# Patient Record
Sex: MALE | Race: WHITE
[De-identification: names, ages, dates, MRNs, and addresses within clinical notes are randomized per-mention and may not be internally consistent; named-entity substitution may affect disease eponyms.]

---

## 2018-05-20 ENCOUNTER — HOSPITAL ENCOUNTER (EMERGENCY)
Dept: HOSPITAL 47 - EC | Age: 23
Discharge: HOME | End: 2018-05-20
Payer: COMMERCIAL

## 2018-05-20 VITALS — SYSTOLIC BLOOD PRESSURE: 137 MMHG | TEMPERATURE: 98 F | DIASTOLIC BLOOD PRESSURE: 78 MMHG | HEART RATE: 80 BPM

## 2018-05-20 VITALS — RESPIRATION RATE: 18 BRPM

## 2018-05-20 DIAGNOSIS — K59.00: Primary | ICD-10-CM

## 2018-05-20 DIAGNOSIS — K30: ICD-10-CM

## 2018-05-20 DIAGNOSIS — Z87.891: ICD-10-CM

## 2018-05-20 LAB
ALBUMIN SERPL-MCNC: 4.4 G/DL (ref 3.5–5)
ALP SERPL-CCNC: 117 U/L (ref 38–126)
ALT SERPL-CCNC: 43 U/L (ref 21–72)
AMYLASE SERPL-CCNC: 47 U/L (ref 30–110)
ANION GAP SERPL CALC-SCNC: 15 MMOL/L
AST SERPL-CCNC: 31 U/L (ref 17–59)
BASOPHILS # BLD AUTO: 0 K/UL (ref 0–0.2)
BASOPHILS NFR BLD AUTO: 0 %
BUN SERPL-SCNC: 16 MG/DL (ref 9–20)
CALCIUM SPEC-MCNC: 9.8 MG/DL (ref 8.4–10.2)
CHLORIDE SERPL-SCNC: 103 MMOL/L (ref 98–107)
CO2 SERPL-SCNC: 26 MMOL/L (ref 22–30)
EOSINOPHIL # BLD AUTO: 0.2 K/UL (ref 0–0.7)
EOSINOPHIL NFR BLD AUTO: 3 %
ERYTHROCYTE [DISTWIDTH] IN BLOOD BY AUTOMATED COUNT: 4.92 M/UL (ref 4.3–5.9)
ERYTHROCYTE [DISTWIDTH] IN BLOOD: 12.4 % (ref 11.5–15.5)
GLUCOSE SERPL-MCNC: 116 MG/DL (ref 74–99)
HCT VFR BLD AUTO: 42.9 % (ref 39–53)
HGB BLD-MCNC: 14.7 GM/DL (ref 13–17.5)
LIPASE SERPL-CCNC: 124 U/L (ref 23–300)
LYMPHOCYTES # SPEC AUTO: 2.3 K/UL (ref 1–4.8)
LYMPHOCYTES NFR SPEC AUTO: 33 %
MCH RBC QN AUTO: 29.9 PG (ref 25–35)
MCHC RBC AUTO-ENTMCNC: 34.4 G/DL (ref 31–37)
MCV RBC AUTO: 87.1 FL (ref 80–100)
MONOCYTES # BLD AUTO: 0.4 K/UL (ref 0–1)
MONOCYTES NFR BLD AUTO: 6 %
NEUTROPHILS # BLD AUTO: 3.8 K/UL (ref 1.3–7.7)
NEUTROPHILS NFR BLD AUTO: 56 %
PH UR: 7 [PH] (ref 5–8)
PLATELET # BLD AUTO: 204 K/UL (ref 150–450)
POTASSIUM SERPL-SCNC: 3.9 MMOL/L (ref 3.5–5.1)
PROT SERPL-MCNC: 7 G/DL (ref 6.3–8.2)
PROT UR QL: (no result)
RBC UR QL: 2 /HPF (ref 0–5)
SODIUM SERPL-SCNC: 144 MMOL/L (ref 137–145)
SP GR UR: 1.03 (ref 1–1.03)
UROBILINOGEN UR QL STRIP: 2 MG/DL (ref ?–2)
WBC # BLD AUTO: 6.9 K/UL (ref 3.8–10.6)

## 2018-05-20 PROCEDURE — 85025 COMPLETE CBC W/AUTO DIFF WBC: CPT

## 2018-05-20 PROCEDURE — 96374 THER/PROPH/DIAG INJ IV PUSH: CPT

## 2018-05-20 PROCEDURE — 96361 HYDRATE IV INFUSION ADD-ON: CPT

## 2018-05-20 PROCEDURE — 96375 TX/PRO/DX INJ NEW DRUG ADDON: CPT

## 2018-05-20 PROCEDURE — 74018 RADEX ABDOMEN 1 VIEW: CPT

## 2018-05-20 PROCEDURE — 36415 COLL VENOUS BLD VENIPUNCTURE: CPT

## 2018-05-20 PROCEDURE — 81001 URINALYSIS AUTO W/SCOPE: CPT

## 2018-05-20 PROCEDURE — 80053 COMPREHEN METABOLIC PANEL: CPT

## 2018-05-20 PROCEDURE — 96372 THER/PROPH/DIAG INJ SC/IM: CPT

## 2018-05-20 PROCEDURE — 82150 ASSAY OF AMYLASE: CPT

## 2018-05-20 PROCEDURE — 83690 ASSAY OF LIPASE: CPT

## 2018-05-20 PROCEDURE — 99284 EMERGENCY DEPT VISIT MOD MDM: CPT

## 2018-05-20 NOTE — XR
EXAMINATION TYPE: XR KUB

 

DATE OF EXAM: 5/20/2018

 

CLINICAL DATA:  22-year-old male with abdominal pain, PHH

 

COMPARISON:  None

 

FINDINGS:

 

Lung bases are clear. 

 

No evidence for free intraperitoneal air. 

 

No dilated small bowel or air-fluid levels. Scattered air and stool seen throughout the colon extendi
ng distally into the rectum. Mild to moderate stool burden. 

 

Possible 5 mm right renal calculus.

 

 

IMPRESSION:

 

1. Mild to moderate stool burden.

2. Possible 5 mm right renal calculus.

3.No evidence of bowel obstruction or free intraperitoneal air.

## 2018-05-20 NOTE — ED
General Adult HPI





- General


Chief complaint: Abdominal Pain


Stated complaint: Nausea/Diarrhea


Time Seen by Provider: 05/20/18 18:06


Source: patient, RN notes reviewed


Mode of arrival: ambulatory


Limitations: no limitations





- History of Present Illness


Initial comments: 





Patient is a pleasant 22-year-old male presenting to the emergency department 

with complaints of loose stools.  Onset was 48 hours ago.  Patient is having 5-

6 small episodes of loose watery stools daily.  Patient has some occasional 

abdominal cramping and indigestion.  Patient has slightly decreased appetite.  

Patient has had some nausea without vomiting.  No fever.  No history of similar 

symptoms previously.





- Related Data


 Allergies











Allergy/AdvReac Type Severity Reaction Status Date / Time


 


No Known Allergies Allergy   Verified 05/20/18 18:03














Review of Systems


ROS Statement: 


Those systems with pertinent positive or pertinent negative responses have been 

documented in the HPI.





ROS Other: All systems not noted in ROS Statement are negative.


Constitutional: Denies: fever


Eyes: Denies: eye pain


ENT: Denies: ear pain


Respiratory: Denies: cough


Cardiovascular: Denies: chest pain


Endocrine: Denies: fatigue


Gastrointestinal: Reports: as per HPI, nausea, diarrhea


Genitourinary: Denies: dysuria


Musculoskeletal: Denies: back pain


Skin: Denies: rash


Neurological: Denies: weakness





Past Medical History


Past Medical History: No Reported History


History of Any Multi-Drug Resistant Organisms: None Reported


Past Surgical History: No Surgical Hx Reported


Past Psychological History: Anxiety, Depression


Smoking Status: Former smoker


Past Alcohol Use History: Daily


Past Drug Use History: None Reported





General Exam


Limitations: no limitations


General appearance: alert, in no apparent distress


Head exam: Present: atraumatic


Eye exam: Present: normal appearance, PERRL


ENT exam: Present: normal oropharynx


Neck exam: Present: normal inspection


Respiratory exam: Present: normal lung sounds bilaterally


Cardiovascular Exam: Present: regular rate, normal rhythm


  ** Expanded


Peripheral pulses: 2+: Posterior Tibialis (R), Posterior Tibialis (L)


GI/Abdominal exam: Present: soft, normal bowel sounds.  Absent: distended, 

tenderness, guarding, rebound, rigid, pulsatile mass


Extremities exam: Present: normal inspection


Neurological exam: Present: alert


Psychiatric exam: Present: normal affect, normal mood


Skin exam: Present: normal color





Course


 Vital Signs











  05/20/18





  18:00


 


Temperature 98.7 F


 


Pulse Rate 79


 


Respiratory 18





Rate 


 


Blood Pressure 165/80


 


O2 Sat by Pulse 98





Oximetry 














Medical Decision Making





- Medical Decision Making





Patient reevaluated and resting comfortably in bed.  Patient is feeling better.

  Patient updated on results.  Patient is offered medication for constipation 

however refuses at this time.  Patient will wait for a day or 2 to see how is 

doing and then try over-the-counter treatments at that time.  Patient states he 

will consider trying magnesium citrate.  Patient is also advised to consider 

prune juice.





- Lab Data


Result diagrams: 


 05/20/18 18:28





 05/20/18 18:28


 Lab Results











  05/20/18 05/20/18 05/20/18 Range/Units





  18:28 18:28 19:00 


 


WBC   6.9   (3.8-10.6)  k/uL


 


RBC   4.92   (4.30-5.90)  m/uL


 


Hgb   14.7   (13.0-17.5)  gm/dL


 


Hct   42.9   (39.0-53.0)  %


 


MCV   87.1   (80.0-100.0)  fL


 


MCH   29.9   (25.0-35.0)  pg


 


MCHC   34.4   (31.0-37.0)  g/dL


 


RDW   12.4   (11.5-15.5)  %


 


Plt Count   204   (150-450)  k/uL


 


Neutrophils %   56   %


 


Lymphocytes %   33   %


 


Monocytes %   6   %


 


Eosinophils %   3   %


 


Basophils %   0   %


 


Neutrophils #   3.8   (1.3-7.7)  k/uL


 


Lymphocytes #   2.3   (1.0-4.8)  k/uL


 


Monocytes #   0.4   (0-1.0)  k/uL


 


Eosinophils #   0.2   (0-0.7)  k/uL


 


Basophils #   0.0   (0-0.2)  k/uL


 


Sodium  144    (137-145)  mmol/L


 


Potassium  3.9    (3.5-5.1)  mmol/L


 


Chloride  103    ()  mmol/L


 


Carbon Dioxide  26    (22-30)  mmol/L


 


Anion Gap  15    mmol/L


 


BUN  16    (9-20)  mg/dL


 


Creatinine  0.72    (0.66-1.25)  mg/dL


 


Est GFR (CKD-EPI)AfAm  >90    (>60 ml/min/1.73 sqM)  


 


Est GFR (CKD-EPI)NonAf  >90    (>60 ml/min/1.73 sqM)  


 


Glucose  116 H    (74-99)  mg/dL


 


Calcium  9.8    (8.4-10.2)  mg/dL


 


Total Bilirubin  0.3    (0.2-1.3)  mg/dL


 


AST  31    (17-59)  U/L


 


ALT  43    (21-72)  U/L


 


Alkaline Phosphatase  117    ()  U/L


 


Total Protein  7.0    (6.3-8.2)  g/dL


 


Albumin  4.4    (3.5-5.0)  g/dL


 


Amylase  47    ()  U/L


 


Lipase  124    ()  U/L


 


Urine Color    Yellow  


 


Urine Appearance    Cloudy  (Clear)  


 


Urine pH    7.0  (5.0-8.0)  


 


Ur Specific Gravity    1.028  (1.001-1.035)  


 


Urine Protein    1+ H  (Negative)  


 


Urine Glucose (UA)    Negative  (Negative)  


 


Urine Ketones    Negative  (Negative)  


 


Urine Blood    Negative  (Negative)  


 


Urine Nitrite    Negative  (Negative)  


 


Urine Bilirubin    Negative  (Negative)  


 


Urine Urobilinogen    2.0  (<2.0)  mg/dL


 


Ur Leukocyte Esterase    Negative  (Negative)  


 


Urine RBC    2  (0-5)  /hpf


 


Amorphous Sediment    Few H  (None)  /hpf


 


Urine Mucus    Rare H  (None)  /hpf














- Radiology Data


Radiology results: image reviewed (General x-ray shows mild to moderate stool 

burn.  Possible right renal calculi.)





Disposition


Clinical Impression: 


 Constipation, Abdominal pain





Disposition: HOME SELF-CARE


Condition: Stable


Instructions:  Abdominal Pain (ED), Constipation (ED), High Fiber Diet (ED)


Additional Instructions: 


Please follow-up with primary care physician in the next day or 2 for recheck.  

Return for unable to have bowel movement, vomiting, fevers, increased pain, 

uncontrolled diarrhea, worsening symptoms or other concerns.


Is patient prescribed a controlled substance at d/c from ED?: No


Referrals: 


Rah Newton DO [Primary Care Provider] - 1-2 days


Time of Disposition: 19:42